# Patient Record
Sex: FEMALE | Race: WHITE | NOT HISPANIC OR LATINO | Employment: OTHER | URBAN - METROPOLITAN AREA
[De-identification: names, ages, dates, MRNs, and addresses within clinical notes are randomized per-mention and may not be internally consistent; named-entity substitution may affect disease eponyms.]

---

## 2024-08-03 ENCOUNTER — OFFICE VISIT (OUTPATIENT)
Dept: URGENT CARE | Facility: CLINIC | Age: 64
End: 2024-08-03
Payer: MEDICARE

## 2024-08-03 VITALS
BODY MASS INDEX: 24.45 KG/M2 | DIASTOLIC BLOOD PRESSURE: 82 MMHG | SYSTOLIC BLOOD PRESSURE: 134 MMHG | WEIGHT: 143.2 LBS | RESPIRATION RATE: 16 BRPM | HEART RATE: 68 BPM | TEMPERATURE: 98.2 F | OXYGEN SATURATION: 98 % | HEIGHT: 64 IN

## 2024-08-03 DIAGNOSIS — J40 BRONCHITIS: ICD-10-CM

## 2024-08-03 DIAGNOSIS — R05.2 SUBACUTE COUGH: Primary | ICD-10-CM

## 2024-08-03 PROBLEM — M79.603 PAIN IN ARM, UNSPECIFIED: Status: RESOLVED | Noted: 2023-05-24 | Resolved: 2024-08-03

## 2024-08-03 PROBLEM — Z82.49 FAMILY HISTORY OF ISCHEMIC HEART DISEASE AND OTHER DISEASES OF THE CIRCULATORY SYSTEM: Status: ACTIVE | Noted: 2023-05-24

## 2024-08-03 PROBLEM — R07.9 CHEST PAIN, UNSPECIFIED: Status: RESOLVED | Noted: 2023-05-24 | Resolved: 2024-08-03

## 2024-08-03 PROBLEM — E78.5 HYPERLIPIDEMIA, UNSPECIFIED: Status: ACTIVE | Noted: 2023-05-24

## 2024-08-03 PROBLEM — M25.551 PAIN IN RIGHT HIP: Status: RESOLVED | Noted: 2024-05-09 | Resolved: 2024-08-03

## 2024-08-03 PROBLEM — J20.9 ACUTE BRONCHITIS, UNSPECIFIED: Status: RESOLVED | Noted: 2023-07-25 | Resolved: 2024-08-03

## 2024-08-03 PROCEDURE — 99203 OFFICE O/P NEW LOW 30 MIN: CPT

## 2024-08-03 PROCEDURE — 1036F TOBACCO NON-USER: CPT

## 2024-08-03 PROCEDURE — 3008F BODY MASS INDEX DOCD: CPT

## 2024-08-03 RX ORDER — ACETAMINOPHEN AND CODEINE PHOSPHATE 300; 30 MG/1; MG/1
TABLET ORAL
COMMUNITY
Start: 2023-11-08

## 2024-08-03 RX ORDER — CLINDAMYCIN HYDROCHLORIDE 300 MG/1
1 CAPSULE ORAL EVERY 6 HOURS
COMMUNITY
Start: 2024-05-09 | End: 2024-08-03 | Stop reason: WASHOUT

## 2024-08-03 RX ORDER — LORAZEPAM 0.5 MG/1
TABLET ORAL
COMMUNITY
Start: 2024-07-02

## 2024-08-03 RX ORDER — ROSUVASTATIN CALCIUM 10 MG/1
1 TABLET, COATED ORAL
COMMUNITY
Start: 2024-06-26

## 2024-08-03 RX ORDER — METHYLPREDNISOLONE 4 MG/1
TABLET ORAL
Qty: 21 TABLET | Refills: 0 | Status: SHIPPED | OUTPATIENT
Start: 2024-08-03

## 2024-08-03 RX ORDER — TIZANIDINE 2 MG/1
TABLET ORAL
COMMUNITY
Start: 2024-06-26

## 2024-08-03 RX ORDER — AMOXICILLIN AND CLAVULANATE POTASSIUM 875; 125 MG/1; MG/1
1 TABLET, FILM COATED ORAL 2 TIMES DAILY
Qty: 14 TABLET | Refills: 0 | Status: SHIPPED | OUTPATIENT
Start: 2024-08-03 | End: 2024-08-10

## 2024-08-03 RX ORDER — CHLORHEXIDINE GLUCONATE ORAL RINSE 1.2 MG/ML
SOLUTION DENTAL
COMMUNITY
Start: 2024-04-23

## 2024-08-03 RX ORDER — IBUPROFEN 800 MG/1
TABLET ORAL
COMMUNITY
Start: 2024-06-26

## 2024-08-03 RX ORDER — HYDROCODONE BITARTRATE AND ACETAMINOPHEN 5; 325 MG/1; MG/1
TABLET ORAL
COMMUNITY
Start: 2024-04-24

## 2024-08-03 RX ORDER — BENZONATATE 200 MG/1
200 CAPSULE ORAL 3 TIMES DAILY PRN
Qty: 21 CAPSULE | Refills: 0 | Status: SHIPPED | OUTPATIENT
Start: 2024-08-03 | End: 2024-08-10

## 2024-08-03 ASSESSMENT — ENCOUNTER SYMPTOMS
CHEST TIGHTNESS: 1
FATIGUE: 0
STRIDOR: 0
LIGHT-HEADEDNESS: 0
DIZZINESS: 0
SWEATS: 0
DIARRHEA: 0
MUSCULOSKELETAL NEGATIVE: 1
MYALGIAS: 0
SHORTNESS OF BREATH: 1
HEADACHES: 0
GASTROINTESTINAL NEGATIVE: 1
PALPITATIONS: 0
FACIAL SWELLING: 0
ABDOMINAL PAIN: 0
FEVER: 0
TROUBLE SWALLOWING: 0
SORE THROAT: 0
RHINORRHEA: 0
NAUSEA: 0
WHEEZING: 0
ARTHRALGIAS: 0
CARDIOVASCULAR NEGATIVE: 1
COUGH: 1
CHILLS: 0
VOICE CHANGE: 0
WEAKNESS: 0
VOMITING: 0
DIAPHORESIS: 0
NEUROLOGICAL NEGATIVE: 1

## 2024-08-03 ASSESSMENT — COPD QUESTIONNAIRES: COPD: 0

## 2024-08-03 ASSESSMENT — VISUAL ACUITY: OU: 1

## 2024-08-03 NOTE — PATIENT INSTRUCTIONS
LOWER RESPIRATORY INFECTION      - AUGMENTIN is an antibiotic that has been prescribed to fight infection in your lungs    - BENZONATATE (Tessalon perles) have been prescribed to help reduce cough    - METHYLPREDNISOLONE (steroid) has been prescribed to reduce inflammation in the airways      - Mucinex (guaifenesin) recommended to help thin mucus making it easier to clear and reduce nasal congestion         - Use Ibuprofen (Advil) or Acetaminophen (Tylenol) at home for headache and fever reduction if needed     - Recommended use of OTC cough and cold syrups like Dayquil/Nyquil or Mucinex containing guaifenesin (thins mucus), phenylephrine (nasal decongestant) , and/or dextromethorphan (cough suppresant) if not using Bromfed syrup RX         - Increase rest and fluids to recover sooner and loosen mucus     - May use a humidifier, cough drops, saline nasal wash (Neti pot) for symptom relief     - Avoid cigarette smoke and do not vape as this will continue to irritate airway (may use nicotine gum or patches if nicotine dependent)    - If any chest pain or difficulty breathing occurs please go to the ER  - If no improvement after 7-10 days, please follow up with PCP, if you need a referral, please call our office.     Antibiotics fight against and reduce all bacteria in your body.  Yeast infections, abdominal pain, and diarrhea are very common side effects of taking antibiotics, as the good bacteria is destroyed. Taking supplemental probiotics or eating probiotic yogurt (Activia, Chobani, Kefir) can help replace the bacteria and restore balance preventing or relieving these side effects.     ** It is very important that you complete the whole prescription of antibiotics to kill all of the bacteria causing illness even if symptoms improve. If any of the targeted bacteria survives and returns to cause infection, it will then be stronger and more resistant to antibiotic medications in the future.     Steroids are strong  medications that mimic the body's natural production of cortisol and is prescribed to reduce inflammation especially when pain is caused by inflammation. Short-term side effects of steroids reviewed, including gastritis, insomnia, moodiness, acne, fluid retention (leg swelling) and potential for increase in blood pressure or sugar. If you have have a history of hypertension or diabetes, you should monitor at home regularly while on this medication and discontinue if levels are high (BP>160/90 or glucose >200). Steroids are often prescribed to be tapered off into lower doses after prolonged use to avoid withdrawal symptoms such as fatigue, weakness, body aches, nausea, and lightheadedness.  However, if a severe reaction is noted (including rash or difficulty breathing) discontinue and seek emergency care immediately.

## 2024-08-03 NOTE — PROGRESS NOTES
Subjective   History  Aurora Mehta is a 64 y.o. female who presents for Cough.    Patient presents with chest congestion and productive cough for the last 10 days that worsened last night. She reports her  was also sick earlier this week with similar symptoms. She states that she is now coughing up green colored mucus.       History provided by:  Patient and medical records  Cough  This is a new problem. The problem has been gradually worsening. The cough is Productive of purulent sputum. Associated symptoms include shortness of breath. Pertinent negatives include no chest pain, chills, ear congestion, ear pain, fever, headaches, myalgias, nasal congestion, rash, rhinorrhea, sore throat, sweats or wheezing. Nothing aggravates the symptoms. She has tried rest and OTC cough suppressant for the symptoms. The treatment provided mild relief. Her past medical history is significant for bronchitis and environmental allergies. There is no history of asthma, COPD or emphysema.     No past surgical history on file.  Social History     Tobacco Use    Smoking status: Never     Passive exposure: Never    Smokeless tobacco: Never   Substance Use Topics    Alcohol use: Never    Drug use: Not on file       Review of Systems   Review of Systems   Constitutional:  Negative for chills, diaphoresis, fatigue and fever.   HENT: Negative.  Negative for congestion, ear pain, facial swelling, rhinorrhea, sore throat, trouble swallowing and voice change.    Respiratory:  Positive for cough, chest tightness and shortness of breath. Negative for wheezing and stridor.    Cardiovascular: Negative.  Negative for chest pain and palpitations.   Gastrointestinal: Negative.  Negative for abdominal pain, diarrhea, nausea and vomiting.   Musculoskeletal: Negative.  Negative for arthralgias and myalgias.   Skin: Negative.  Negative for rash.   Allergic/Immunologic: Positive for environmental allergies.   Neurological: Negative.  Negative  "for dizziness, weakness, light-headedness and headaches.       Objective   Vital Signs  /82   Pulse 68   Temp 36.8 °C (98.2 °F)   Resp 16   Ht 1.626 m (5' 4\")   Wt 65 kg (143 lb 3.2 oz)   LMP  (LMP Unknown)   SpO2 98%   BMI 24.58 kg/m²    All vitals have been reviewed and are stable.     Physical Exam  Physical Exam  Vitals and nursing note reviewed.   Constitutional:       General: She is awake. She is not in acute distress.     Appearance: Normal appearance. She is well-developed. She is not ill-appearing, toxic-appearing or diaphoretic.   HENT:      Head: Normocephalic and atraumatic. No right periorbital erythema or left periorbital erythema.      Jaw: There is normal jaw occlusion.      Right Ear: External ear normal.      Left Ear: External ear normal.      Nose: Nose normal. No mucosal edema, congestion or rhinorrhea.      Mouth/Throat:      Lips: Pink. No lesions.      Mouth: Mucous membranes are moist. No oral lesions or angioedema.      Pharynx: Oropharynx is clear. Uvula midline. Posterior oropharyngeal erythema present. No oropharyngeal exudate, uvula swelling or postnasal drip.   Eyes:      General: Lids are normal. Vision grossly intact. Gaze aligned appropriately.      Extraocular Movements: Extraocular movements intact.      Conjunctiva/sclera: Conjunctivae normal.      Right eye: Right conjunctiva is not injected.      Left eye: Left conjunctiva is not injected.      Pupils: Pupils are equal, round, and reactive to light.   Cardiovascular:      Rate and Rhythm: Normal rate and regular rhythm.      Heart sounds: Normal heart sounds, S1 normal and S2 normal.   Pulmonary:      Effort: Pulmonary effort is normal. No tachypnea, accessory muscle usage, prolonged expiration or respiratory distress.      Breath sounds: Normal air entry. Transmitted upper airway sounds present. No stridor. No wheezing, rhonchi or rales.   Abdominal:      General: Abdomen is flat. There is no distension.      " Palpations: Abdomen is soft.   Musculoskeletal:         General: No swelling or deformity. Normal range of motion.      Cervical back: Full passive range of motion without pain, normal range of motion and neck supple.   Skin:     General: Skin is warm and dry.      Findings: No erythema or rash.   Neurological:      General: No focal deficit present.      Mental Status: She is alert and oriented to person, place, and time. Mental status is at baseline.      Motor: Motor function is intact.      Coordination: Coordination is intact.   Psychiatric:         Mood and Affect: Mood and affect normal.         Speech: Speech normal.         Behavior: Behavior normal. Behavior is cooperative.         Thought Content: Thought content normal.         Judgment: Judgment normal.         Diagnostic Results   No results found for this or any previous visit (from the past 48 hour(s)).    Assessment/Plan   Procedures   N/A    Problem List Items Addressed This Visit    None  Visit Diagnoses       Subacute cough    -  Primary    Relevant Medications    amoxicillin-pot clavulanate (Augmentin) 875-125 mg tablet    methylPREDNISolone (Medrol Dospak) 4 mg tablets    benzonatate (Tessalon) 200 mg capsule    Bronchitis        Relevant Medications    amoxicillin-pot clavulanate (Augmentin) 875-125 mg tablet    methylPREDNISolone (Medrol Dospak) 4 mg tablets            UC Course  Patient disposition: Home    Red flags for reporting to ER have been reviewed with the patient.    Current diagnosis, any medication changes, and all in-office lab or radiologic results have been reviewed with the patient at the time of the visit.   If symptoms do not improve or worsen, patient is to follow up with PCP or report to the emergency room.   Patient is alert and oriented x3 and non-toxic appearing. Vital signs are stable.   Patient and/or guardian has sufficient decision-making capabilities at this time and reports understanding and agreement with the  treatment plan made through shared decision-making.